# Patient Record
Sex: FEMALE | Race: WHITE
[De-identification: names, ages, dates, MRNs, and addresses within clinical notes are randomized per-mention and may not be internally consistent; named-entity substitution may affect disease eponyms.]

---

## 2018-01-19 ENCOUNTER — HOSPITAL ENCOUNTER (OUTPATIENT)
Dept: HOSPITAL 53 - M OPP | Age: 58
Discharge: HOME | End: 2018-01-19
Attending: INTERNAL MEDICINE
Payer: COMMERCIAL

## 2018-01-19 DIAGNOSIS — Z09: Primary | ICD-10-CM

## 2018-01-19 DIAGNOSIS — E03.9: ICD-10-CM

## 2018-01-19 DIAGNOSIS — K57.30: ICD-10-CM

## 2018-01-19 DIAGNOSIS — Z90.89: ICD-10-CM

## 2018-01-19 DIAGNOSIS — Z98.890: ICD-10-CM

## 2018-01-19 DIAGNOSIS — Z80.0: ICD-10-CM

## 2018-01-19 DIAGNOSIS — D12.2: ICD-10-CM

## 2018-01-19 DIAGNOSIS — Z88.8: ICD-10-CM

## 2018-01-19 DIAGNOSIS — Z78.0: ICD-10-CM

## 2018-01-19 DIAGNOSIS — K63.5: ICD-10-CM

## 2018-01-19 DIAGNOSIS — Z79.899: ICD-10-CM

## 2018-01-19 DIAGNOSIS — K64.8: ICD-10-CM

## 2018-01-19 DIAGNOSIS — E05.00: ICD-10-CM

## 2018-01-19 PROCEDURE — 45385 COLONOSCOPY W/LESION REMOVAL: CPT

## 2018-01-19 RX ADMIN — SODIUM CHLORIDE 1 MLS/HR: 9 INJECTION, SOLUTION INTRAVENOUS at 09:40

## 2018-09-07 ENCOUNTER — HOSPITAL ENCOUNTER (EMERGENCY)
Dept: HOSPITAL 53 - M ED | Age: 58
Discharge: HOME | End: 2018-09-07
Payer: COMMERCIAL

## 2018-09-07 DIAGNOSIS — Z79.899: ICD-10-CM

## 2018-09-07 DIAGNOSIS — R00.1: ICD-10-CM

## 2018-09-07 DIAGNOSIS — I37.9: ICD-10-CM

## 2018-09-07 DIAGNOSIS — F17.200: ICD-10-CM

## 2018-09-07 DIAGNOSIS — Z88.6: ICD-10-CM

## 2018-09-07 DIAGNOSIS — I10: ICD-10-CM

## 2018-09-07 DIAGNOSIS — R07.89: Primary | ICD-10-CM

## 2018-09-07 LAB
ANION GAP: 9 MEQ/L (ref 8–16)
BASO #: 0.1 10^3/UL (ref 0–0.2)
BASO %: 1.1 % (ref 0–1)
BLOOD UREA NITROGEN: 11 MG/DL (ref 7–18)
CALCIUM LEVEL: 7.9 MG/DL (ref 8.5–10.1)
CARBON DIOXIDE LEVEL: 25 MEQ/L (ref 21–32)
CHLORIDE LEVEL: 111 MEQ/L (ref 98–107)
CK MB CFR.DF SERPL CALC: 2.72
CK SERPL-CCNC: 88 U/L (ref 26–192)
CK-MB VALUE MASS: 2.4 NG/ML (ref ?–3.6)
CREATININE FOR GFR: 0.66 MG/DL (ref 0.55–1.3)
EOS #: 0.2 10^3/UL (ref 0–0.5)
EOSINOPHIL NFR BLD AUTO: 2 % (ref 0–3)
GFR SERPL CREATININE-BSD FRML MDRD: > 60 ML/MIN/{1.73_M2} (ref 51–?)
GLUCOSE, FASTING: 87 MG/DL (ref 70–100)
HEMATOCRIT: 37.7 % (ref 36–47)
HEMOGLOBIN: 12.4 G/DL (ref 12–15.5)
IMMATURE GRANULOCYTE %: 0.2 % (ref 0–3)
INR: 0.91
LYMPH #: 2.2 10^3/UL (ref 1.5–4.5)
LYMPH %: 24.2 % (ref 24–44)
MEAN CORPUSCULAR HEMOGLOBIN: 31.4 PG (ref 27–33)
MEAN CORPUSCULAR HGB CONC: 32.9 G/DL (ref 32–36.5)
MEAN CORPUSCULAR VOLUME: 95.4 FL (ref 80–96)
MONO #: 0.7 10^3/UL (ref 0–0.8)
MONO %: 7.5 % (ref 0–5)
NEUTROPHILS #: 5.8 10^3/UL (ref 1.8–7.7)
NEUTROPHILS %: 65 % (ref 36–66)
NRBC BLD AUTO-RTO: 0 % (ref 0–0)
PARTIAL THROMBOPLASTIN TIME: 26.2 SECONDS (ref 25.4–37.6)
PLATELET COUNT, AUTOMATED: 263 10^3/UL (ref 150–450)
POTASSIUM SERUM: 4.4 MEQ/L (ref 3.5–5.1)
PROTHROMBIN TIME: 12.4 SECONDS (ref 12.1–14.4)
RED BLOOD COUNT: 3.95 10^6/UL (ref 4–5.4)
RED CELL DISTRIBUTION WIDTH: 12.9 % (ref 11.5–14.5)
SODIUM LEVEL: 145 MEQ/L (ref 136–145)
TROPONIN I: 0.03 NG/ML (ref ?–0.1)
WHITE BLOOD COUNT: 8.9 10^3/UL (ref 4–10)

## 2018-09-07 RX ADMIN — IPRATROPIUM BROMIDE AND ALBUTEROL SULFATE 1 ML: .5; 3 SOLUTION RESPIRATORY (INHALATION) at 05:04

## 2018-09-07 RX ADMIN — METHYLPREDNISOLONE SODIUM SUCCINATE 1 MG: 125 INJECTION, POWDER, FOR SOLUTION INTRAMUSCULAR; INTRAVENOUS at 05:00

## 2021-03-28 ENCOUNTER — HOSPITAL ENCOUNTER (EMERGENCY)
Dept: HOSPITAL 53 - M ED | Age: 61
Discharge: HOME | End: 2021-03-28
Payer: COMMERCIAL

## 2021-03-28 VITALS — DIASTOLIC BLOOD PRESSURE: 72 MMHG | SYSTOLIC BLOOD PRESSURE: 128 MMHG

## 2021-03-28 VITALS — WEIGHT: 174.61 LBS | BODY MASS INDEX: 32.97 KG/M2 | HEIGHT: 61 IN
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
## 2021-03-28 DIAGNOSIS — M25.722: ICD-10-CM

## 2021-03-28 DIAGNOSIS — S63.92XA: ICD-10-CM

## 2021-03-28 DIAGNOSIS — Z88.8: ICD-10-CM

## 2021-03-28 DIAGNOSIS — S60.222A: Primary | ICD-10-CM
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
## 2021-03-28 DIAGNOSIS — S53.402A: ICD-10-CM

## 2021-03-28 DIAGNOSIS — M25.742: ICD-10-CM
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
## 2021-03-28 DIAGNOSIS — M19.042: ICD-10-CM
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
## 2021-03-28 DIAGNOSIS — F17.200: ICD-10-CM

## 2021-03-28 DIAGNOSIS — M85.832: ICD-10-CM

## 2021-03-28 NOTE — REP
INDICATION:

fall injury.



COMPARISON:

None.



TECHNIQUE:

Four views of the left wrist.



FINDINGS:

There is diffuse osteopenia.  Moderate osteoarthritis is seen at the 1st

carpometacarpal articulation.  There is some osteoarthritic spurring at the 1st MCP

joint as well.  There is no evidence of fracture or subluxation.



IMPRESSION:

Osteoarthritic changes.  No fracture seen.





<Electronically signed by Pilo Headley > 03/28/21 1121

## 2021-03-28 NOTE — REP
INDICATION:

fall.



COMPARISON:

No comparison hand radiographs..



TECHNIQUE:

Four views of the left hand.



FINDINGS:

Four views of the left hand demonstrate fairly advanced osteoarthritis at the 1st

carpometacarpal articulation.  There is mild osteoarthritic spurring at the 1st MCP

and IP joints.  Moderate osteoarthritis is seen at the DIP joint of the index finger

with associated swelling.  DIP osteoarthritic spurring is noted at the long, ring, and

small fingers as well.  No fracture or subluxation is seen.



IMPRESSION:

Osteoarthritic changes as above.  No fracture or other traumatic abnormality seen..







<Electronically signed by Pilo Headley > 03/28/21 [id number]

## 2021-03-28 NOTE — REP
INDICATION:

fall.



COMPARISON:

None.



TECHNIQUE:

Four views of the left elbow.



FINDINGS:

Four views of the left elbow demonstrate coronoid and olecranon process osteoarthritic

spurring.  There is no evidence of fracture, subluxation, or joint effusion..  .  No

opaque foreign body noted.





IMPRESSION:

Osteoarthritic spurring of the proximal ulna.  No fracture or other traumatic

abnormality seen..







<Electronically signed by Pilo Headley > 03/28/21 0922

## 2021-03-29 ENCOUNTER — HOSPITAL ENCOUNTER (OUTPATIENT)
Dept: HOSPITAL 53 - M SOG | Age: 61
End: 2021-03-29
Attending: ORTHOPAEDIC SURGERY
Payer: COMMERCIAL

## 2021-03-29 DIAGNOSIS — M25.532: Primary | ICD-10-CM

## 2021-03-30 NOTE — REP
INDICATION:

PAIN IN LEFT WRIST.



COMPARISON:

03/28/2021



TECHNIQUE:

AP and lateral views of the left wrist



FINDINGS:

Moderate osteoarthritic degenerative changes at the 1st and 2nd carpometacarpal

joints.  No acute fracture or dislocation.



IMPRESSION:

No acute fracture or dislocation appreciated.





<Electronically signed by Raz Clemens > 03/30/21 0428